# Patient Record
Sex: MALE | Race: BLACK OR AFRICAN AMERICAN
[De-identification: names, ages, dates, MRNs, and addresses within clinical notes are randomized per-mention and may not be internally consistent; named-entity substitution may affect disease eponyms.]

---

## 2020-08-26 ENCOUNTER — HOSPITAL ENCOUNTER (EMERGENCY)
Dept: HOSPITAL 72 - EMR | Age: 30
Discharge: HOME | End: 2020-08-26
Payer: SELF-PAY

## 2020-08-26 VITALS — DIASTOLIC BLOOD PRESSURE: 70 MMHG | SYSTOLIC BLOOD PRESSURE: 135 MMHG

## 2020-08-26 VITALS — DIASTOLIC BLOOD PRESSURE: 87 MMHG | SYSTOLIC BLOOD PRESSURE: 145 MMHG

## 2020-08-26 VITALS — WEIGHT: 195 LBS | BODY MASS INDEX: 28.88 KG/M2 | HEIGHT: 69 IN

## 2020-08-26 DIAGNOSIS — S01.511A: ICD-10-CM

## 2020-08-26 DIAGNOSIS — S49.92XA: ICD-10-CM
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
## 2020-08-26 DIAGNOSIS — S01.01XA: Primary | ICD-10-CM
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
## 2020-08-26 PROCEDURE — 99284 EMERGENCY DEPT VISIT MOD MDM: CPT

## 2020-08-26 PROCEDURE — 70450 CT HEAD/BRAIN W/O DYE: CPT

## 2020-08-26 PROCEDURE — 70486 CT MAXILLOFACIAL W/O DYE: CPT

## 2020-08-26 PROCEDURE — 12001 RPR S/N/AX/GEN/TRNK 2.5CM/<: CPT

## 2020-08-26 PROCEDURE — 12011 RPR F/E/E/N/L/M 2.5 CM/<: CPT
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
## 2020-08-26 NOTE — NUR
ED Nurse Note:



Pt's head/wound and sutured was covered with non stick dressing and wrapped 
with kerlix then secured with tape.
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
## 2020-09-03 ENCOUNTER — HOSPITAL ENCOUNTER (EMERGENCY)
Dept: HOSPITAL 72 - EMR | Age: 30
Discharge: HOME | End: 2020-09-03
Payer: SELF-PAY

## 2020-09-03 DIAGNOSIS — Z48.02: Primary | ICD-10-CM

## 2020-09-03 PROCEDURE — 99281 EMR DPT VST MAYX REQ PHY/QHP: CPT
